# Patient Record
Sex: FEMALE | Race: WHITE | NOT HISPANIC OR LATINO | Employment: FULL TIME | URBAN - METROPOLITAN AREA
[De-identification: names, ages, dates, MRNs, and addresses within clinical notes are randomized per-mention and may not be internally consistent; named-entity substitution may affect disease eponyms.]

---

## 2019-10-11 ENCOUNTER — HOSPITAL ENCOUNTER (EMERGENCY)
Facility: CLINIC | Age: 54
Discharge: HOME | End: 2019-10-11
Attending: PHYSICIAN ASSISTANT | Admitting: PHYSICIAN ASSISTANT
Payer: COMMERCIAL

## 2019-10-11 VITALS
OXYGEN SATURATION: 99 % | HEART RATE: 82 BPM | DIASTOLIC BLOOD PRESSURE: 86 MMHG | TEMPERATURE: 98 F | BODY MASS INDEX: 28.52 KG/M2 | SYSTOLIC BLOOD PRESSURE: 159 MMHG | WEIGHT: 155 LBS | HEIGHT: 62 IN

## 2019-10-11 DIAGNOSIS — R30.0 DYSURIA: ICD-10-CM

## 2019-10-11 DIAGNOSIS — N89.8 VAGINAL IRRITATION: ICD-10-CM

## 2019-10-11 LAB
ALBUMIN UR-MCNC: NEGATIVE MG/DL
APPEARANCE UR: CLEAR
BACTERIA #/AREA URNS HPF: ABNORMAL /HPF
BILIRUB UR QL STRIP: NEGATIVE
COLOR UR AUTO: YELLOW
GLUCOSE UR STRIP-MCNC: NEGATIVE MG/DL
HGB UR QL STRIP: ABNORMAL
KETONES UR STRIP-MCNC: NEGATIVE MG/DL
LEUKOCYTE ESTERASE UR QL STRIP: NEGATIVE
MUCOUS THREADS #/AREA URNS LPF: PRESENT /LPF
NITRATE UR QL: NEGATIVE
PH UR STRIP: 5 PH (ref 5–7)
RBC #/AREA URNS AUTO: 3 /HPF (ref 0–2)
SOURCE: ABNORMAL
SP GR UR STRIP: 1.02 (ref 1–1.03)
SPECIMEN SOURCE: NORMAL
SQUAMOUS #/AREA URNS AUTO: 1 /HPF (ref 0–1)
UROBILINOGEN UR STRIP-MCNC: 0 MG/DL (ref 0–2)
WBC #/AREA URNS AUTO: 1 /HPF (ref 0–5)
WET PREP SPEC: NORMAL

## 2019-10-11 PROCEDURE — 87210 SMEAR WET MOUNT SALINE/INK: CPT | Performed by: PHYSICIAN ASSISTANT

## 2019-10-11 PROCEDURE — 87086 URINE CULTURE/COLONY COUNT: CPT | Performed by: PHYSICIAN ASSISTANT

## 2019-10-11 PROCEDURE — G0463 HOSPITAL OUTPT CLINIC VISIT: HCPCS | Performed by: PHYSICIAN ASSISTANT

## 2019-10-11 PROCEDURE — 99214 OFFICE O/P EST MOD 30 MIN: CPT | Mod: Z6 | Performed by: PHYSICIAN ASSISTANT

## 2019-10-11 PROCEDURE — 81001 URINALYSIS AUTO W/SCOPE: CPT | Performed by: PHYSICIAN ASSISTANT

## 2019-10-11 RX ORDER — ATORVASTATIN CALCIUM 20 MG/1
20 TABLET, FILM COATED ORAL DAILY
COMMUNITY

## 2019-10-11 ASSESSMENT — ENCOUNTER SYMPTOMS
FEVER: 0
CARDIOVASCULAR NEGATIVE: 1
VOMITING: 0
HEMATURIA: 0
DYSURIA: 1
FLANK PAIN: 0
RESPIRATORY NEGATIVE: 1
DIARRHEA: 0
ABDOMINAL PAIN: 0
NAUSEA: 0
BACK PAIN: 0
FREQUENCY: 0

## 2019-10-11 ASSESSMENT — MIFFLIN-ST. JEOR: SCORE: 1266.33

## 2019-10-11 NOTE — ED PROVIDER NOTES
History     Chief Complaint   Patient presents with     Vaginal Itching     HPI  Bonnie Bal is a 54 year old female who  presents today vaginal irritation and dysuria. Patient has been dealing with what she thought was a vaginal yeast infection for the past two months that would improve and then return. Patient states she treated her symptoms over the counter for vaginal yeast infection and then was evaluated at and urgency center a few weeks ago where she was diagnosed with bacterial vaginosis and given flagyl for 7 days. Patient finished the flagyl as directed, but now has had 2-3 days of vaginal irritation and burning with urination after she used a new soap and shaved. Patient denies vaginal odor, discharge, abdominal pain, fevers, back pain, hematuria, urinary frequency or urgency. Patient is not sexually active with no history of STDs. Patient has had a hysterectomy, but still has her ovaries. Patient states she does have a history of hematuria and has seen a urologist in the past for this.     Problem list, Medication list, Allergies, and Medical/Social/Surgical histories reviewed in [facility] and updated as appropriate.    Allergies:  No Known Allergies    Problem List:    There are no active problems to display for this patient.       Past Medical History:    History reviewed. No pertinent past medical history.    Past Surgical History:    History reviewed. No pertinent surgical history.    Family History:    History reviewed. No pertinent family history.    Social History:  Marital Status:    Social History     Tobacco Use     Smoking status: Never Smoker     Smokeless tobacco: Never Used   Substance Use Topics     Alcohol use: None     Drug use: None        Medications:    Apixaban (ELIQUIS PO)  atorvastatin (LIPITOR) 20 MG tablet          Review of Systems   Constitutional: Negative for fever.   Respiratory: Negative.    Cardiovascular: Negative.    Gastrointestinal: Negative for abdominal pain,  "diarrhea, nausea and vomiting.   Genitourinary: Positive for dysuria. Negative for flank pain, frequency, genital sores, hematuria, pelvic pain, urgency, vaginal bleeding, vaginal discharge and vaginal pain.        Vaginal irritation   Musculoskeletal: Negative for back pain.   Skin: Negative for rash.   All other systems reviewed and are negative.      Physical Exam   BP: (!) 159/86  Pulse: 82  Temp: 98  F (36.7  C)  Height: 157.5 cm (5' 2\")  Weight: 70.3 kg (155 lb)  SpO2: 99 %      Physical Exam     BP (!) 159/86   Pulse 82   Temp 98  F (36.7  C) (Oral)   Ht 1.575 m (5' 2\")   Wt 70.3 kg (155 lb)   SpO2 99%   BMI 28.35 kg/m      GENERAL APPEARANCE: healthy, alert and no distress  RESP: lungs clear to auscultation - no rales, rhonchi or wheezes  CV: regular rates and rhythm, normal S1 S2, no murmur noted  ABDOMEN:  soft, nontender, no HSM or masses and bowel sounds normal  BACK: No CVA tenderness  GU_female: external genitalia normal, urethra without abnormality or discharge, cervix normal in appearance , no adenexal tenderness or masses noted, vaginal discharge: whitish discharge without odor or clumps noted.   SKIN: no suspicious lesions or rashes    Results for orders placed or performed during the hospital encounter of 10/11/19   UA with Microscopic   Result Value Ref Range    Color Urine Yellow     Appearance Urine Clear     Glucose Urine Negative NEG^Negative mg/dL    Bilirubin Urine Negative NEG^Negative    Ketones Urine Negative NEG^Negative mg/dL    Specific Gravity Urine 1.020 1.003 - 1.035    Blood Urine Moderate (A) NEG^Negative    pH Urine 5.0 5.0 - 7.0 pH    Protein Albumin Urine Negative NEG^Negative mg/dL    Urobilinogen mg/dL 0.0 0.0 - 2.0 mg/dL    Nitrite Urine Negative NEG^Negative    Leukocyte Esterase Urine Negative NEG^Negative    Source Midstream Urine     WBC Urine 1 0 - 5 /HPF    RBC Urine 3 (H) 0 - 2 /HPF    Bacteria Urine Few (A) NEG^Negative /HPF    Squamous Epithelial /HPF Urine 1 " 0 - 1 /HPF    Mucous Urine Present (A) NEG^Negative /LPF   Wet prep   Result Value Ref Range    Specimen Description Vagina     Wet Prep No yeast seen     Wet Prep No Trichomonas seen     Wet Prep No clue cells seen     Wet Prep Few  WBC'S seen                ED Course        Procedures              Critical Care time:  none               Results for orders placed or performed during the hospital encounter of 10/11/19 (from the past 24 hour(s))   Wet prep   Result Value Ref Range    Specimen Description Vagina     Wet Prep No yeast seen     Wet Prep No Trichomonas seen     Wet Prep No clue cells seen     Wet Prep Few  WBC'S seen      UA with Microscopic   Result Value Ref Range    Color Urine Yellow     Appearance Urine Clear     Glucose Urine Negative NEG^Negative mg/dL    Bilirubin Urine Negative NEG^Negative    Ketones Urine Negative NEG^Negative mg/dL    Specific Gravity Urine 1.020 1.003 - 1.035    Blood Urine Moderate (A) NEG^Negative    pH Urine 5.0 5.0 - 7.0 pH    Protein Albumin Urine Negative NEG^Negative mg/dL    Urobilinogen mg/dL 0.0 0.0 - 2.0 mg/dL    Nitrite Urine Negative NEG^Negative    Leukocyte Esterase Urine Negative NEG^Negative    Source Midstream Urine     WBC Urine 1 0 - 5 /HPF    RBC Urine 3 (H) 0 - 2 /HPF    Bacteria Urine Few (A) NEG^Negative /HPF    Squamous Epithelial /HPF Urine 1 0 - 1 /HPF    Mucous Urine Present (A) NEG^Negative /LPF       Medications - No data to display    Assessments & Plan (with Medical Decision Making)     I have reviewed the nursing notes.    I have reviewed the findings, diagnosis, plan and need for follow up with the patient.   54-year-old female resents the urgent care with persistent vaginal irritation that she is treated a couple times for yeast infection over-the-counter that was diagnosed and treated for bacterial vaginosis which she finished treatment recently for.  Patient also noticed some dysuria after she used a new soap and shaved.  See exam  findings above.  UA obtained in office today which shows moderate blood, few bacteria, mucus present, 1 squamous epithelial, 3 RBCs as well as 1 WBC.  Urine culture currently pending.  Wet prep also obtained in office today which was negative.  Patient states she does have a history of hematuria and has seen a urologist for this in the past.  Discussed with patient that at this time would not treat results from the UA for urinary tract infection or acute cystitis.  I think this is more contamination would recommend waiting for urine culture results.  Patient agrees with this.  Patient increase fluids, rest, avoid any soaps, detergents, douching, bubble baths, or irritants to the vaginal area.  Patient to follow-up with her primary care doctor next week if symptoms persist or fail to improve.  Patient return sooner if symptoms worsen or change.  These were discussed with patient and given on discharge paperwork.  Patient discharged in stable condition.    Discharge Medication List as of 10/11/2019  3:08 PM          Final diagnoses:   Vaginal irritation   Dysuria       10/11/2019   [facility] EMERGENCY DEPARTMENT     Flores Mcneil PA-C  10/11/19 1957

## 2019-10-11 NOTE — DISCHARGE INSTRUCTIONS
No medication indicated at this time.   Wet prep today was negative.       Patient advised to call for any lab results (if obtained during visit) within 2-3 days. Urine culture pending.   Drink plenty of fluids.  Daily probiotics, yogurt and/or cottage cheese.      Prevention and treatment of UTI's discussed.  Signs and symptoms of pyelonephritis mentioned (fevers, back pain, abdominal pain, pelvic pain, nausea/vomiting)  Follow up with primary care provider or GYN for recheck of urine in 3-5 days if symptoms persist/fail to improve.   Patient to return to clinic sooner if not improving as expected.   Go to ER if any worsening symptoms or signs/symptoms of phylonephritis occur.

## 2019-10-12 LAB
BACTERIA SPEC CULT: NO GROWTH
Lab: NORMAL
SPECIMEN SOURCE: NORMAL

## 2021-05-29 ENCOUNTER — RECORDS - HEALTHEAST (OUTPATIENT)
Dept: ADMINISTRATIVE | Facility: CLINIC | Age: 56
End: 2021-05-29

## 2021-05-30 ENCOUNTER — RECORDS - HEALTHEAST (OUTPATIENT)
Dept: ADMINISTRATIVE | Facility: CLINIC | Age: 56
End: 2021-05-30

## 2022-07-02 ENCOUNTER — APPOINTMENT (OUTPATIENT)
Dept: CT IMAGING | Facility: CLINIC | Age: 57
End: 2022-07-02
Attending: EMERGENCY MEDICINE
Payer: COMMERCIAL

## 2022-07-02 ENCOUNTER — HOSPITAL ENCOUNTER (EMERGENCY)
Facility: CLINIC | Age: 57
Discharge: HOME | End: 2022-07-02
Attending: EMERGENCY MEDICINE | Admitting: EMERGENCY MEDICINE
Payer: COMMERCIAL

## 2022-07-02 VITALS
WEIGHT: 163 LBS | BODY MASS INDEX: 30 KG/M2 | HEIGHT: 62 IN | SYSTOLIC BLOOD PRESSURE: 138 MMHG | OXYGEN SATURATION: 100 % | DIASTOLIC BLOOD PRESSURE: 80 MMHG | HEART RATE: 66 BPM | RESPIRATION RATE: 18 BRPM | TEMPERATURE: 98.5 F

## 2022-07-02 DIAGNOSIS — Z79.01 CHRONIC ANTICOAGULATION: ICD-10-CM

## 2022-07-02 DIAGNOSIS — S09.90XA CLOSED HEAD INJURY, INITIAL ENCOUNTER: ICD-10-CM

## 2022-07-02 DIAGNOSIS — S01.81XA LACERATION OF FOREHEAD, INITIAL ENCOUNTER: ICD-10-CM

## 2022-07-02 PROCEDURE — 12011 RPR F/E/E/N/L/M 2.5 CM/<: CPT | Performed by: EMERGENCY MEDICINE

## 2022-07-02 PROCEDURE — 99284 EMERGENCY DEPT VISIT MOD MDM: CPT | Mod: 25 | Performed by: EMERGENCY MEDICINE

## 2022-07-02 PROCEDURE — 272N000047 HC ADHESIVE DERMABOND SKIN: Performed by: EMERGENCY MEDICINE

## 2022-07-02 PROCEDURE — 99282 EMERGENCY DEPT VISIT SF MDM: CPT | Mod: 25 | Performed by: EMERGENCY MEDICINE

## 2022-07-02 PROCEDURE — 70450 CT HEAD/BRAIN W/O DYE: CPT

## 2022-07-02 ASSESSMENT — ENCOUNTER SYMPTOMS
RESPIRATORY NEGATIVE: 1
MUSCULOSKELETAL NEGATIVE: 1
CARDIOVASCULAR NEGATIVE: 1
EYES NEGATIVE: 1
HEMATOLOGIC/LYMPHATIC NEGATIVE: 1
ENDOCRINE NEGATIVE: 1
PSYCHIATRIC NEGATIVE: 1
ALLERGIC/IMMUNOLOGIC NEGATIVE: 1
NEUROLOGICAL NEGATIVE: 1
GASTROINTESTINAL NEGATIVE: 1
WOUND: 1

## 2022-07-02 NOTE — DISCHARGE INSTRUCTIONS
1) Your scalp laceration at the hairline did not require suture closure. The wound has been glued.  Keep it clean and dry apply topical antibiotic ointment.  Avoid showering or water for about 24 hours.    2) Head imaging did not reveal any evidence of skull fracture or bleeding in the brain as result of the crockpot falling onto your head.    3) should apply topical antibiotic ointment to scalp wound over the next 3 days.  If there is any concerns for infection or any new concerns you should return to re-evaluated

## 2022-07-02 NOTE — ED TRIAGE NOTES
Here after crock pot fell landing on her head, small lac to top of forehead, bleeding stopped but is on eliquis for hx of clots. Denies LOC or post-concussive symptoms     Triage Assessment     Row Name 07/02/22 4190       Triage Assessment (Adult)    Airway WDL WDL       Respiratory WDL    Respiratory WDL WDL       Skin Circulation/Temperature WDL    Skin Circulation/Temperature WDL WDL       Cardiac WDL    Cardiac WDL WDL       Peripheral/Neurovascular WDL    Peripheral Neurovascular WDL WDL       Cognitive/Neuro/Behavioral WDL    Cognitive/Neuro/Behavioral WDL WDL

## 2022-07-02 NOTE — ED NOTES
Pt presents to ED with c/o head injury. Pt states was grabbing standard size crockpot off shelf and fell onto anterior head-small laceration noted-bleeding controlled. Pt denies LOC. Pt reports Eliquis. Denies headache, vision changes.

## 2022-07-02 NOTE — ED PROVIDER NOTES
History     Chief Complaint   Patient presents with     Head Injury     HPI  Bonnie Bal is a 57 year old female who presents for evaluation with head injury patient on intake reported that her crockpot had fallen on her head.  She is currently on Eliquis and Lipitor.    On examination patient arrived alone by car.  She reports she was in her kitchen cleaning her countertop when crockpot that was above fell striking her on the forehead.  She was concerned that there was a fair amount of bleeding.  She did not lose consciousness.  She sustained a small laceration just around the hairline and presents for further care.  Patient tells me she is on Eliquis for history of DVT and PE has been Eliquis since 2019.  She was also told that she has a PFO but has not followed up since she was told she had a PFO.  She had a stroke in 2006 by report    Allergies:  Allergies   Allergen Reactions     Amoxicillin Anaphylaxis       Problem List:    There are no problems to display for this patient.       Past Medical History:    Past Medical History:   Diagnosis Date     Cerebrovascular accident (H)      DVT (deep venous thrombosis) (H)        Past Surgical History:    Past Surgical History:   Procedure Laterality Date     IR CAROTID ANGIOGRAM  4/30/2006     IR IVC FILTER PLACEMENT  5/1/2006     IR VENOCAVAGRAM INFERIOR  5/1/2006     IR VENOCAVAGRAM INFERIOR  12/8/2006       Family History:    No family history on file.    Social History:  Marital Status:   [4]  Social History     Tobacco Use     Smoking status: Never Smoker     Smokeless tobacco: Never Used        Medications:    Apixaban (ELIQUIS PO)  atorvastatin (LIPITOR) 20 MG tablet          Review of Systems   Constitutional:        Crockpot fell onto my forehead   HENT: Negative.    Eyes: Negative.    Respiratory: Negative.    Cardiovascular: Negative.    Gastrointestinal: Negative.    Endocrine: Negative.    Genitourinary: Negative.    Musculoskeletal:  "Negative.    Skin: Positive for wound (forehead laceration).   Allergic/Immunologic: Negative.    Neurological: Negative.    Hematological: Negative.    Psychiatric/Behavioral: Negative.    All other systems reviewed and are negative.      Physical Exam   BP: (!) 150/93  Pulse: 82  Temp: 98.5  F (36.9  C)  Resp: 18  Height: 157.5 cm (5' 2\")  Weight: 73.9 kg (163 lb)  SpO2: 99 %      Physical Exam  Constitutional:       Appearance: Normal appearance.   HENT:      Head: Normocephalic. Laceration (1cm laceration over the forehead at the hair line) present.        Nose: Nose normal.   Eyes:      Extraocular Movements: Extraocular movements intact.      Pupils: Pupils are equal, round, and reactive to light.   Cardiovascular:      Rate and Rhythm: Normal rate.      Pulses: Normal pulses.      Heart sounds: Normal heart sounds.   Musculoskeletal:      Cervical back: Normal range of motion and neck supple.   Skin:     Capillary Refill: Capillary refill takes less than 2 seconds.      Coloration: Skin is not jaundiced or pale.      Findings: No bruising, erythema, lesion or rash.   Neurological:      General: No focal deficit present.      Mental Status: She is alert and oriented to person, place, and time.      Cranial Nerves: No cranial nerve deficit.      Sensory: No sensory deficit.      Motor: No weakness.      Coordination: Coordination normal.      Gait: Gait normal.      Deep Tendon Reflexes: Reflexes normal.   Psychiatric:         Mood and Affect: Mood normal.         Behavior: Behavior normal.         Thought Content: Thought content normal.         Judgment: Judgment normal.         ED Course                 Procedures              Critical Care time:  none               ED medications: none      ED vitals:  Vitals:    07/02/22 1643 07/02/22 1715 07/02/22 1730 07/02/22 1815   BP: (!) 150/93 (!) 146/90 (!) 141/79 138/80   Pulse: 82 86 75 66   Resp: 18      Temp: 98.5  F (36.9  C)      TempSrc: Tympanic      SpO2: " "99% 100% 100% 100%   Weight: 73.9 kg (163 lb)      Height: 1.575 m (5' 2\")            ED labs and imaging:  Results for orders placed or performed during the hospital encounter of 07/02/22   Head CT w/o contrast     Status: None    Narrative    EXAM: CT HEAD W/O CONTRAST  LOCATION: [facility]  DATE/TIME: 7/2/2022 5:44 PM    INDICATION: Scalp laceration. Closed head injury. Chronic anticoagulation. Eval for acute intracranial process.  COMPARISON: CT head 04/30/2006  TECHNIQUE: Routine CT Head without IV contrast. Multiplanar reformats. Dose reduction techniques were used.    FINDINGS:  INTRACRANIAL CONTENTS: No intracranial hemorrhage, extraaxial collection, or mass effect.  No CT evidence of acute infarct. Normal parenchymal attenuation. Normal ventricles and sulci.     VISUALIZED ORBITS/SINUSES/MASTOIDS: No intraorbital abnormality. No paranasal sinus mucosal disease. No middle ear or mastoid effusion.    BONES/SOFT TISSUES: Midline frontal scalp swelling and suspected small laceration. No skull fracture.      Impression    IMPRESSION:  1.  No CT evidence for acute intracranial process.  2.  Midline frontal scalp injury without associated fracture.       Assessments & Plan (with Medical Decision Making)   Assessment Summary and Clinical Impression: 57-year-old female who presented for evaluation with head injury and forehead laceration after a crockpot fell on her head.  Patient is on chronic anticoagulation with Eliquis for history of stroke, DVT and PE by report.  Injury occurred just prior to ED arrival.  There was no loss of consciousness.  Patient sustained a 1 cm laceration that was not actively bleeding and not gaping.  After reviewing options for wound closure she agreed to skin glue with Dermabond.  The wound was explored to ensure there is no debris in the wound edges were well approximated.  After wound closure with skin glue she was observed.  We discussed that the impact " with head trauma from the crockpot was low and that likelihood for significant intracranial injury is low however given she is anticoagulated we agreed to pursue head imaging especially because she lives at home alone.  She reported no neck pain and no other injuries.  Head imaging revealed no acute intracranial process after blunt head trauma close head injury.  She is discharged home precautions for close head injury and wound care for her scalp laceration.  No need for suture removal given wound closed with skin glue.    ED course and plan:  Reviewed the medical record.  Wound care was completed.  There was no gaping wound and no debris in the wound.  Wound edges were well approximated with skin glue.  There is no neck pain and no other injuries reported.  Head imaging was obtained given her anticoagulation.  Head CT revealed no acute intracranial process.  There is a midline frontal scalp injury without associated fracture. She was discharged with wound care and outpatient follow-up with low threshold to return for evaluation.  We also reviewed precautions after head trauma.  Patient expressed comfort going home.  We discussed and reviewed wound care instructions including signs of wound infection.  She expressed comfort, understanding agreement with plan of care.      Disclaimer: This note consists of symbols derived from keyboarding, dictation and/or voice recognition software. As a result, there may be errors in the script that have gone undetected. Please consider this when interpreting information found in this chart.  I have reviewed the nursing notes.    I have reviewed the findings, diagnosis, plan and need for follow up with the patient.       Discharge Medication List as of 7/2/2022  7:06 PM          Final diagnoses:   Closed head injury, initial encounter   Laceration of forehead, initial encounter - 1cm at the hairline   Chronic anticoagulation - Eliquis       7/2/2022   [facility]  EMERGENCY DEPT     Rashel Mitchell MD  07/03/22 005

## 2022-11-08 NOTE — ED AVS SNAPSHOT
[facility]  [address] [address]  Phone:  [phone]  Fax:  [phone]                                    Bonnie Bal   MRN: [id number]    Department:  [facility]   Date of Visit:  10/11/2019           After Visit Summary Signature Page    I have received my discharge instructions, and my questions have been answered. I have discussed any challenges I see with this plan with the nurse or doctor.    ..........................................................................................................................................  Patient/Patient Representative Signature      ..........................................................................................................................................  Patient Representative Print Name and Relationship to Patient    ..................................................               ................................................  Date                                   Time    ..........................................................................................................................................  Reviewed by Signature/Title    ...................................................              ..............................................  Date                                               Time          22EPIC Rev 08/18        Methotrexate Pregnancy And Lactation Text: This medication is Pregnancy Category X and is known to cause fetal harm. This medication is excreted in breast milk.